# Patient Record
Sex: FEMALE | Race: WHITE | URBAN - METROPOLITAN AREA
[De-identification: names, ages, dates, MRNs, and addresses within clinical notes are randomized per-mention and may not be internally consistent; named-entity substitution may affect disease eponyms.]

---

## 2019-10-04 ENCOUNTER — OFFICE VISIT (OUTPATIENT)
Dept: ORTHOPEDIC SURGERY | Age: 64
End: 2019-10-04

## 2019-10-04 VITALS
BODY MASS INDEX: 26.7 KG/M2 | OXYGEN SATURATION: 100 % | SYSTOLIC BLOOD PRESSURE: 141 MMHG | TEMPERATURE: 97.1 F | DIASTOLIC BLOOD PRESSURE: 79 MMHG | HEART RATE: 74 BPM | HEIGHT: 64 IN | RESPIRATION RATE: 14 BRPM | WEIGHT: 156.4 LBS

## 2019-10-04 DIAGNOSIS — G89.29 CHRONIC RIGHT SHOULDER PAIN: Primary | ICD-10-CM

## 2019-10-04 DIAGNOSIS — M25.511 CHRONIC RIGHT SHOULDER PAIN: Primary | ICD-10-CM

## 2019-10-04 DIAGNOSIS — M75.101 ROTATOR CUFF SYNDROME OF RIGHT SHOULDER: ICD-10-CM

## 2019-10-04 DIAGNOSIS — M79.605 LEFT LEG PAIN: ICD-10-CM

## 2019-10-04 RX ORDER — ASCORBIC ACID 500 MG
TABLET ORAL
COMMUNITY

## 2019-10-04 RX ORDER — VITAMIN E 268 MG
CAPSULE ORAL DAILY
COMMUNITY

## 2019-10-04 RX ORDER — BETAMETHASONE VALERATE 1.2 MG/G
CREAM TOPICAL 2 TIMES DAILY
COMMUNITY

## 2019-10-04 RX ORDER — LECITHIN 1200 MG
CAPSULE ORAL
COMMUNITY

## 2019-10-04 RX ORDER — DOXYCYCLINE 100 MG/1
100 CAPSULE ORAL 2 TIMES DAILY
COMMUNITY

## 2019-10-04 RX ORDER — GARLIC 1000 MG
CAPSULE ORAL
COMMUNITY

## 2019-10-04 RX ORDER — CYCLOBENZAPRINE HCL 5 MG
5 TABLET ORAL
COMMUNITY

## 2019-10-04 RX ORDER — ASPIRIN 81 MG/1
TABLET ORAL DAILY
COMMUNITY

## 2019-10-04 RX ORDER — MELOXICAM 7.5 MG/1
TABLET ORAL DAILY
COMMUNITY

## 2019-10-04 RX ORDER — TRIAMCINOLONE ACETONIDE 40 MG/ML
40 INJECTION, SUSPENSION INTRA-ARTICULAR; INTRAMUSCULAR ONCE
Qty: 1 VIAL | Refills: 0
Start: 2019-10-04 | End: 2019-10-04

## 2019-10-04 RX ORDER — MULTIVIT WITH MINERALS/HERBS
1 TABLET ORAL DAILY
COMMUNITY

## 2019-10-04 NOTE — PROGRESS NOTES
1. Have you been to the ER, urgent care clinic since your last visit? Hospitalized since your last visit? No    2. Have you seen or consulted any other health care providers outside of the [address] [place] [address] since your last visit? Include any pap smears or colon screening.  No

## 2019-10-04 NOTE — PROGRESS NOTES
Verbal order given by Dr. Kaci Diop to draw up 2 cc of Kenalog.   Patient is allergic to Lidocaine/Xylocaine    Injection given in RIGHT shoulder

## 2023-02-24 ENCOUNTER — OFFICE VISIT (OUTPATIENT)
Age: 68
End: 2023-02-24

## 2023-02-24 VITALS — HEIGHT: 64 IN | BODY MASS INDEX: 25.1 KG/M2 | WEIGHT: 147 LBS | TEMPERATURE: 98.2 F

## 2023-02-24 DIAGNOSIS — M75.101 RIGHT ROTATOR CUFF TEAR ARTHROPATHY: ICD-10-CM

## 2023-02-24 DIAGNOSIS — G89.29 CHRONIC RIGHT SHOULDER PAIN: Primary | ICD-10-CM

## 2023-02-24 DIAGNOSIS — M75.102 NONTRAUMATIC TEAR OF LEFT ROTATOR CUFF, UNSPECIFIED TEAR EXTENT: ICD-10-CM

## 2023-02-24 DIAGNOSIS — M12.811 RIGHT ROTATOR CUFF TEAR ARTHROPATHY: ICD-10-CM

## 2023-02-24 DIAGNOSIS — M25.511 CHRONIC RIGHT SHOULDER PAIN: Primary | ICD-10-CM

## 2023-02-24 DIAGNOSIS — M75.121 NONTRAUMATIC COMPLETE TEAR OF RIGHT ROTATOR CUFF: ICD-10-CM

## 2023-02-24 RX ORDER — DOXYCYCLINE HYCLATE 100 MG
TABLET ORAL
COMMUNITY
Start: 2022-12-21

## 2023-02-24 RX ORDER — ASCORBIC ACID 500 MG
TABLET ORAL
COMMUNITY

## 2023-02-24 RX ORDER — ASPIRIN 81 MG/1
TABLET ORAL DAILY
COMMUNITY

## 2023-02-24 RX ORDER — TRIAMCINOLONE ACETONIDE 40 MG/ML
40 INJECTION, SUSPENSION INTRA-ARTICULAR; INTRAMUSCULAR ONCE
Status: COMPLETED | OUTPATIENT
Start: 2023-02-24 | End: 2023-02-24

## 2023-02-24 RX ORDER — CYCLOBENZAPRINE HCL 5 MG
5 TABLET ORAL
COMMUNITY

## 2023-02-24 RX ORDER — BACLOFEN 10 MG/1
TABLET ORAL
COMMUNITY
Start: 2022-12-21

## 2023-02-24 RX ORDER — MELOXICAM 7.5 MG/1
7.5-15 TABLET ORAL DAILY PRN
COMMUNITY
Start: 2022-12-20

## 2023-02-24 RX ORDER — LOTEPREDNOL ETABONATE 2.5 MG/ML
SUSPENSION/ DROPS OPHTHALMIC PRN
COMMUNITY

## 2023-02-24 RX ORDER — ALPRAZOLAM 0.5 MG/1
TABLET ORAL
COMMUNITY
Start: 2022-12-21

## 2023-02-24 RX ADMIN — TRIAMCINOLONE ACETONIDE 40 MG: 40 INJECTION, SUSPENSION INTRA-ARTICULAR; INTRAMUSCULAR at 11:03

## 2023-02-24 NOTE — PROGRESS NOTES
[facility] AMBULATORY OFFICE NOTE      Patient: Rosalva Oviedo                MRN: [id number]       SSN: [id number]  YOB: 1955        AGE: 69 y.o. SEX: female  Body mass index is 25.63 kg/m². PCP: No primary care provider on file. 02/24/23    CHIEF COMPLAINT: Bilateral shoulder pain 8/10    HPI: Rosalva Oviedo is a 69 y.o. female patient who complains of bilateral shoulder pain. The right is worse than the left. She rates the pain as 8 out of 10. She also notices significant dysfunction of the right shoulder. This has been going on for over 10 years but has gotten better in the past with treatment. She denies any recent injury or trauma. She has difficulty with use of the right arm altogether and has some difficulty with use of the left arm especially above shoulder height.     Past Medical History:   Diagnosis Date    Cancer ([facility] [address]        Family History   Problem Relation Age of Onset    Cancer Father     Lung Disease Father     Asthma Father     Cancer Mother        Current Outpatient Medications   Medication Sig Dispense Refill    ALPRAZolam (XANAX) 0.5 MG tablet take 1/2 to 1 tablet by mouth every 8 hours if needed for anxiety MAY CAUSE DROWSINESS      Apple Cider Vinegar 600 MG CAPS Take by mouth      ascorbic acid (VITAMIN C) 500 MG tablet Take by mouth      aspirin 81 MG EC tablet Take by mouth daily      baclofen (LIORESAL) 10 MG tablet take 1/2 to 1 tablet by mouth twice a day if needed for BACK PAIN      betamethasone valerate (VALISONE) 0.1 % cream Apply 1 application topically 2 times daily as needed      Cholecalciferol 50 MCG (2000 UT) CAPS Take 4,000 Units by mouth daily      cyclobenzaprine (FLEXERIL) 5 MG tablet Take 5 mg by mouth      doxycycline hyclate (VIBRA-TABS) 100 MG tablet take 1 tablet by mouth every 12 hours      Loteprednol Etabonate (EYSUVIS) 0.25 % SUSP Apply to eye as needed      meloxicam (MOBIC) 7.5 MG tablet Take 7.5-15 mg by mouth daily as needed       No current facility-administered medications for this visit. Allergies   Allergen Reactions    Latex Hives, Rash and Swelling     swelling      Bee Venom Anaphylaxis     And systemic reaction       Other Anaphylaxis    Penicillins Anaphylaxis and Swelling    Acarbose      Other reaction(s): other/intolerance  Blood sugar  In the 300's    Amoxicillin-Pot Clavulanate Hives    Azithromycin Hives    Codeine      Other reaction(s): other/intolerance, rash/itching  \"WINDS ME UP\"    Dexlansoprazole Hives    Diphenhydramine      Other reaction(s): neurological reaction, Unknown (comments)  CAN'T SLEEP  Nervous and jittery      Molds & Smuts Swelling    Pantoprazole      Other reaction(s): gi distress, rash/itching    Povidone-Iodine Hives     Other reaction(s): other/intolerance  Swelling and redness      Procaine      Other reaction(s): Not Reported This Time, other/intolerance  HEADACHE  headache      Tetanus Toxoids      Other reaction(s): other/intolerance, Unknown (comments)  FEVER  Ran fever of 104 and was in the bed for several days went to ed      Oxycodone-Acetaminophen Hives and Rash     Other reaction(s): wheezing/sob  Unsure but went to the ED after taking states had SOB      Simvastatin Rash       History reviewed. No pertinent surgical history.     Social History     Socioeconomic History    Marital status: Unknown     Spouse name: Not on file    Number of children: Not on file    Years of education: Not on file    Highest education level: Not on file   Occupational History    Not on file   Tobacco Use    Smoking status: Some Days    Smokeless tobacco: Never   Substance and Sexual Activity    Alcohol use: Not on file    Drug use: Not on file    Sexual activity: Not on file   Other Topics Concern    Not on file   Social History Narrative    Not on file     Social Determinants of Health     Financial Resource Strain: Not on file   Food Insecurity: Not on file   Transportation Needs: Not on file   Physical Activity: Not on file   Stress: Not on file   Social Connections: Not on file   Intimate Partner Violence: Not on file   Housing Stability: Not on file       REVIEW OF SYSTEMS:    14 point review of systems on the intake form is negative except as noted in the HPI    PHYSICAL EXAMINATION:  Temp 98.2 °F (36.8 °C) (Temporal)   Ht 5' 3.5\" (1.613 m)   Wt 147 lb (66.7 kg)   BMI 25.63 kg/m²   Body mass index is 25.63 kg/m². GENERAL: Alert and oriented x3, in no acute distress, well-developed, well-nourished. HEENT: Normocephalic, atraumatic. Shoulder Examination     R   L  ROM   FF  30   Full  ER  20   Full   IR  Hip   Full  Rotator Cuff Pain   Supra  +   +   Infra  +   -   Subscap -   -  Crepitus  -   -  Effusion  -   -  Warmth  -   -   Erythema  -   -  Instability  -   -  AC Joint TTP  -   -  Clavicle   Deformity -   -   TTP  -   -  Proximal Humerus   Deformity -   -   TTP  -   -  Deltoid Strength 5   5  Biceps Strength 5   5  Biceps Deformity -   -  Biceps Groove Pain -   -  Impingement Sign -   -   Pseudoparalysis noted right shoulder    IMAGING:  Imaging read by myself and interpreted as follows:  4 view right shoulder x-rays taken in the office today show high riding humeral head with some mild arthritic change in the glenohumeral joint consistent with rotator cuff arthropathy and a large massive rotator cuff tear    Previous x-rays of the left shoulder were reviewed in the office today from [facility]. These do not show any acute or chronic bony abnormalities. ASSESSMENT & PLAN  Diagnosis: Right rotator cuff arthropathy, right rotator cuff tear, massive, left rotator cuff tear    Danisha has bilateral shoulder pain. Her right shoulder likely has a massive rotator cuff tear that is irreparable due to the chronicity of her symptoms and her examination as well as her x-rays with high riding humeral head.   The left shoulder x-rays appear normal and she has better function and less pain with the left shoulder. I suspect she likely has at least a partial rotator cuff tear in this 1 as well based on her age and her physical exam.  We discussed the treatment options today for this at length including the possibility of surgery. For the right shoulder I would recommend a reverse shoulder arthroplasty. For the left shoulder she could consider something like a rotator cuff repair if she indeed did have a repairable rotator cuff tear. However after discussing the surgical treatment options as well as her medical comorbidities, she would like to try to continue with conservative management and she elected for corticosteroid injections into both shoulders which she tolerated well. Follow-up as needed. Prescription medication management discussed. [facility]  OFFICE PROCEDURE PROGRESS NOTE        Chart reviewed for the following:   Maria Dolores Graham MD, have reviewed the History, Physical and updated the Allergic reactions for [place] performed immediately prior to start of procedure:   Maria Dolores Graham MD, have performed the following reviews on Rosalva Oviedo prior to the start of the procedure:            * Patient was identified by name and date of birth   * Agreement on procedure being performed was verified  * Risks and Benefits explained to the patient  * Procedure site verified and marked as necessary  * Patient was positioned for comfort  * Consent was signed and verified    Time: 11:02 AM    Location: Bilateral shoulder subacromial space injections    Kenalog 40mg & 3cc Lidocaine    Date of procedure: 2/24/2023    Procedure performed by:  Phil Bush MD    Provider assisted by:  In Office MA    Patient assisted by: self    How tolerated by patient: tolerated the procedure well with no complications    Post Procedural Pain Scale: 0 - No Hurt    Comments: none      Electronically signed by: Phil Bush MD    Note: This note was completed using voice recognition software.   Any typographical/name errors or mistakes are unintentional.